# Patient Record
Sex: FEMALE | Race: WHITE | NOT HISPANIC OR LATINO | Employment: OTHER | URBAN - METROPOLITAN AREA
[De-identification: names, ages, dates, MRNs, and addresses within clinical notes are randomized per-mention and may not be internally consistent; named-entity substitution may affect disease eponyms.]

---

## 2020-05-28 NOTE — PATIENT DISCUSSION
"""S/P IOL OD: Tecnis ZCB00 25.0 (Target: [place]) +Femto/Arcs +Omidria. Continue post operative instructions and drops per schedule.  """

## 2020-06-04 NOTE — PATIENT DISCUSSION
"""S/P IOL OD: Tecnis ZCB00 25.0 (Target: [place]) +Femto/Arcs +Omidria. Continue post operative instructions and drops per schedule.  """

## 2020-06-18 NOTE — PATIENT DISCUSSION
"""S/P IOL OS: Tecnis ZCB00 25.0 (Target: [place]) +Femto/Arcs +Omidria. Continue post operative instructions and drops per schedule.  """

## 2020-06-22 NOTE — PATIENT DISCUSSION
"""S/P IOL OS: Tecnis ZCB00 25.0 (Target: [place]) +Femto/Arcs +Omidria. Continue post operative instructions and drops per schedule.  """

## 2020-07-22 NOTE — PATIENT DISCUSSION
"""S/P IOL OU: OD: Tecnis ZCB00 25.0 (Target: [place])Femto/ArcsOmidria. OS: Tecnis ZCB00 25.0 (Target: [place])/Arcs. "

## 2023-04-14 ENCOUNTER — NEW PATIENT (OUTPATIENT)
Dept: URBAN - METROPOLITAN AREA CLINIC 33 | Facility: CLINIC | Age: 73
End: 2023-04-14

## 2023-04-14 VITALS — HEIGHT: 65 IN | WEIGHT: 118 LBS | BODY MASS INDEX: 19.66 KG/M2

## 2023-04-14 DIAGNOSIS — H02.831: ICD-10-CM

## 2023-04-14 DIAGNOSIS — H25.13: ICD-10-CM

## 2023-04-14 DIAGNOSIS — H43.813: ICD-10-CM

## 2023-04-14 DIAGNOSIS — H02.834: ICD-10-CM

## 2023-04-14 DIAGNOSIS — H04.123: ICD-10-CM

## 2023-04-14 DIAGNOSIS — H31.103: ICD-10-CM

## 2023-04-14 DIAGNOSIS — H43.393: ICD-10-CM

## 2023-04-14 PROCEDURE — 92250 FUNDUS PHOTOGRAPHY W/I&R: CPT

## 2023-04-14 PROCEDURE — 92134 CPTRZ OPH DX IMG PST SGM RTA: CPT

## 2023-04-14 PROCEDURE — 92004 COMPRE OPH EXAM NEW PT 1/>: CPT

## 2023-04-14 ASSESSMENT — VISUAL ACUITY
OS_CC: 20/25-1
OS_SC: 20/40-2
OD_SC: 20/40
OD_CC: 20/25+2

## 2023-04-14 ASSESSMENT — TONOMETRY
OS_IOP_MMHG: 6
OD_IOP_MMHG: 9

## 2023-05-26 ENCOUNTER — COMPREHENSIVE EXAM (OUTPATIENT)
Dept: URBAN - METROPOLITAN AREA CLINIC 33 | Facility: CLINIC | Age: 73
End: 2023-05-26

## 2023-05-26 DIAGNOSIS — H04.123: ICD-10-CM

## 2023-05-26 DIAGNOSIS — H02.834: ICD-10-CM

## 2023-05-26 DIAGNOSIS — H43.393: ICD-10-CM

## 2023-05-26 DIAGNOSIS — H02.831: ICD-10-CM

## 2023-05-26 DIAGNOSIS — H43.813: ICD-10-CM

## 2023-05-26 DIAGNOSIS — H31.103: ICD-10-CM

## 2023-05-26 DIAGNOSIS — H25.13: ICD-10-CM

## 2023-05-26 PROCEDURE — 92134 CPTRZ OPH DX IMG PST SGM RTA: CPT

## 2023-05-26 PROCEDURE — 92012 INTRM OPH EXAM EST PATIENT: CPT

## 2023-05-26 PROCEDURE — 92250 FUNDUS PHOTOGRAPHY W/I&R: CPT

## 2023-05-26 ASSESSMENT — TONOMETRY
OD_IOP_MMHG: 12
OS_IOP_MMHG: 11

## 2023-05-26 ASSESSMENT — VISUAL ACUITY
OS_CC: 20/25+2
OD_CC: 20/20-2